# Patient Record
Sex: FEMALE | Race: WHITE | NOT HISPANIC OR LATINO | URBAN - METROPOLITAN AREA
[De-identification: names, ages, dates, MRNs, and addresses within clinical notes are randomized per-mention and may not be internally consistent; named-entity substitution may affect disease eponyms.]

---

## 2019-08-01 ENCOUNTER — OFFICE VISIT (OUTPATIENT)
Dept: URBAN - METROPOLITAN AREA CLINIC 33 | Facility: CLINIC | Age: 74
End: 2019-08-01
Payer: MEDICARE

## 2019-08-01 DIAGNOSIS — I69.398 DISTURBANCE OF VISION FOLLOWING CEREBRAL INFARCTION: Primary | ICD-10-CM

## 2019-08-01 PROCEDURE — 99203 OFFICE O/P NEW LOW 30 MIN: CPT | Performed by: OPTOMETRIST

## 2019-08-01 ASSESSMENT — VISUAL ACUITY
OD: 20/40
OS: 20/60

## 2019-08-01 ASSESSMENT — INTRAOCULAR PRESSURE
OD: 18
OS: 17

## 2019-08-01 NOTE — IMPRESSION/PLAN
Impression: Disturbance of vision following cerebral infarction: I69.398. Plan: Patient reports decreased vision OS>OD since most recent stroke one year ago. Patient reports history of 3 major strokes. Ocular health appears unremarkable at this time. Patient evaluated in 2016 for similar symptoms of decreased BCVA and findings suggest impaired vision related to damage from stroke. Monitor at this time.